# Patient Record
Sex: MALE | HISPANIC OR LATINO | URBAN - METROPOLITAN AREA
[De-identification: names, ages, dates, MRNs, and addresses within clinical notes are randomized per-mention and may not be internally consistent; named-entity substitution may affect disease eponyms.]

---
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
## 2022-11-23 NOTE — ED PEDIATRIC TRIAGE NOTE - NS ED NURSE BANDS TYPE
Continue all medications as prescribed.     Follow up with cardiology and nephrology in December.     We will review your records and discuss any tests or exams (colonoscopy, mammogram, etc.) at your next visit.    Name band;

## 2024-06-16 NOTE — ED PROVIDER NOTE - ENMT, MLM
7 (moderate pain)
Airway patent, Nasal mucosa clear. Mouth with normal mucosa. Throat has no vesicles, no oropharyngeal exudates and uvula is midline.

## 2024-09-23 NOTE — ED PEDIATRIC NURSE NOTE - FINAL NURSING ELECTRONIC SIGNATURE
Please refill pt will be out of medication before Monday's appt and pt's Dr who had prescribed has retired.   07-Feb-2017 16:55